# Patient Record
Sex: FEMALE | Race: WHITE
[De-identification: names, ages, dates, MRNs, and addresses within clinical notes are randomized per-mention and may not be internally consistent; named-entity substitution may affect disease eponyms.]

---

## 2018-02-18 ENCOUNTER — HOSPITAL ENCOUNTER (EMERGENCY)
Dept: HOSPITAL 63 - ER | Age: 23
Discharge: HOME | End: 2018-02-18
Payer: OTHER GOVERNMENT

## 2018-02-18 DIAGNOSIS — Z3A.30: ICD-10-CM

## 2018-02-18 DIAGNOSIS — R06.02: ICD-10-CM

## 2018-02-18 DIAGNOSIS — R42: ICD-10-CM

## 2018-02-18 DIAGNOSIS — O99.513: Primary | ICD-10-CM

## 2018-02-18 PROCEDURE — 99281 EMR DPT VST MAYX REQ PHY/QHP: CPT

## 2018-02-18 NOTE — PHYS DOC
Adult General


Chief Complaint


Chief Complaint:  DIZZY/LIGHT HEADED





HPI


HPI





Patient is a 22 year old F who presents with dizziness over the past 2-3 weeks. 

Finesse is 30 weeks pregnant. She also describes associated shortness of breath, 

swelling in her legs and hip/back pain. She feels that her shortness of breath 

and dizziness worsened with change of position and improved with sitting or 

lying down. She denies vision changes. She has had no competitions during this 

pregnancy. She did have a previous pregnancy without complications.  She denies 

vaginal bleeding, discharge is out of the ordinary or contractions. She 

continues to have normal fetal movement.





Review of Systems


Review of Systems





Constitutional: Denies fever or chills []


Eyes: Denies change in visual acuity, redness, or eye pain []


HENT: Denies nasal congestion or sore throat []


Respiratory: Denies cough 


Cardiovascular: No additional information not addressed in HPI []


GI: Denies abdominal pain, nausea, vomiting, bloody stools or diarrhea []


: Denies dysuria or hematuria []


Musculoskeletal: Denies back pain or joint pain []


Integument: Denies rash or skin lesions []


Neurologic: Denies headache, focal weakness or sensory changes []


Endocrine: Denies polyuria or polydipsia []





All other systems were reviewed and found to be within normal limits, except as 

documented in this note.





Family History


Family History


No pertinent family medical history was reported





Physical Exam


Physical Exam





Constitutional: Well developed, well nourished, no acute distress, non-toxic 

appearance. []


HENT: Normocephalic, atraumatic, 


Eyes: EOMI, conjunctiva normal, no discharge. [] 


Neck: Normal range of motion, no tenderness, supple, no stridor. [] 


Cardiovascular:Heart rate regular rhythm, 


Lungs & Thorax:  Bilateral breath sounds clear to auscultation []


Abdomen: Bowel sounds normal, soft, no tenderness, no masses, no pulsatile 

masses. [] Gravid  fetal heart tones 150


Skin: Warm, dry, no erythema, no rash. [] 


Back: No tenderness, no CVA tenderness. [] 


Extremities: No tenderness, no cyanosis, no clubbing, ROM intact, no edema. [] 


Neurologic: Alert and oriented X 3, normal motor function, normal sensory 

function, no focal deficits noted. []


Psychologic: Affect normal, judgement normal, mood normal. []





Current Patient Data


Vital Signs


Normal vital signs. Please review nursing documentation for specifics





EKG


EKG


[]





Radiology/Procedures


Radiology/Procedures


[]





Course & Med Decision Making


Course & Med Decision Making


Pertinent Labs and Imaging studies reviewed. (See chart for details)





[]





Dragon Disclaimer


Dragon Disclaimer


This electronic medical record was generated, in whole or in part, using a 

voice recognition dictation system.





Departure


Departure:


Impression:  


 Primary Impression:  


 Encounter for medical screening examination


Disposition:  01 HOME, SELF-CARE


Condition:  STABLE


Referrals:  


[facility] (PCP)


Patient Instructions:  ABCs of Pregnancy





Additional Instructions:  


Finesse was seen in the emergency department for lightheadedness. No emergency 

medical condition was found on history or physical exam. She was encouraged to 

return to the emergency room if she develops new or worsening symptoms. She was 

also advised follow-up with her primary care/OB doctor in the next 7-10 days 

for further management.











HAIDER HUYNH MD Feb 18, 2018 12:06